# Patient Record
Sex: MALE | Race: WHITE | NOT HISPANIC OR LATINO | Employment: OTHER | URBAN - METROPOLITAN AREA
[De-identification: names, ages, dates, MRNs, and addresses within clinical notes are randomized per-mention and may not be internally consistent; named-entity substitution may affect disease eponyms.]

---

## 2018-08-20 ENCOUNTER — OFFICE VISIT (OUTPATIENT)
Dept: FAMILY MEDICINE CLINIC | Facility: CLINIC | Age: 38
End: 2018-08-20

## 2018-08-20 VITALS
DIASTOLIC BLOOD PRESSURE: 62 MMHG | OXYGEN SATURATION: 99 % | HEART RATE: 60 BPM | RESPIRATION RATE: 20 BRPM | TEMPERATURE: 97.8 F | SYSTOLIC BLOOD PRESSURE: 110 MMHG | WEIGHT: 221 LBS

## 2018-08-20 DIAGNOSIS — G89.29 HEEL PAIN, CHRONIC, RIGHT: ICD-10-CM

## 2018-08-20 DIAGNOSIS — M79.671 HEEL PAIN, CHRONIC, RIGHT: ICD-10-CM

## 2018-08-20 DIAGNOSIS — Z13.220 LIPID SCREENING: ICD-10-CM

## 2018-08-20 DIAGNOSIS — R51.9 ACUTE NONINTRACTABLE HEADACHE, UNSPECIFIED HEADACHE TYPE: Primary | ICD-10-CM

## 2018-08-20 DIAGNOSIS — R53.83 FATIGUE, UNSPECIFIED TYPE: ICD-10-CM

## 2018-08-20 LAB
ALBUMIN SERPL-MCNC: 4.5 G/DL (ref 3.2–4.8)
ALBUMIN/GLOB SERPL: 1.5 G/DL (ref 1.5–2.5)
ALP SERPL-CCNC: 66 U/L (ref 25–100)
ALT SERPL W P-5'-P-CCNC: 25 U/L (ref 7–40)
ANION GAP SERPL CALCULATED.3IONS-SCNC: 6 MMOL/L (ref 3–11)
AST SERPL-CCNC: 24 U/L (ref 0–33)
BASOPHILS # BLD AUTO: 0.01 10*3/MM3 (ref 0–0.2)
BASOPHILS NFR BLD AUTO: 0.2 % (ref 0–1)
BILIRUB SERPL-MCNC: 0.6 MG/DL (ref 0.3–1.2)
BUN BLD-MCNC: 13 MG/DL (ref 9–23)
BUN/CREAT SERPL: 13.5 (ref 7–25)
CALCIUM SPEC-SCNC: 9.6 MG/DL (ref 8.7–10.4)
CHLORIDE SERPL-SCNC: 105 MMOL/L (ref 99–109)
CHOLEST SERPL-MCNC: 199 MG/DL (ref 0–200)
CO2 SERPL-SCNC: 29 MMOL/L (ref 20–31)
CREAT BLD-MCNC: 0.96 MG/DL (ref 0.6–1.3)
DEPRECATED RDW RBC AUTO: 41.3 FL (ref 37–54)
EOSINOPHIL # BLD AUTO: 0.14 10*3/MM3 (ref 0–0.3)
EOSINOPHIL NFR BLD AUTO: 2.5 % (ref 0–3)
ERYTHROCYTE [DISTWIDTH] IN BLOOD BY AUTOMATED COUNT: 12.9 % (ref 11.3–14.5)
GFR SERPL CREATININE-BSD FRML MDRD: 89 ML/MIN/1.73
GLOBULIN UR ELPH-MCNC: 3 GM/DL
GLUCOSE BLD-MCNC: 94 MG/DL (ref 70–100)
HCT VFR BLD AUTO: 45.8 % (ref 38.9–50.9)
HDLC SERPL QL: 4.23
HDLC SERPL-MCNC: 47 MG/DL (ref 40–60)
HGB BLD-MCNC: 15.3 G/DL (ref 13.1–17.5)
IMM GRANULOCYTES # BLD: 0.02 10*3/MM3 (ref 0–0.03)
IMM GRANULOCYTES NFR BLD: 0.4 % (ref 0–0.6)
LDLC SERPL CALC-MCNC: 109 MG/DL (ref 0–100)
LYMPHOCYTES # BLD AUTO: 2.29 10*3/MM3 (ref 0.6–4.8)
LYMPHOCYTES NFR BLD AUTO: 40.2 % (ref 24–44)
MCH RBC QN AUTO: 29.5 PG (ref 27–31)
MCHC RBC AUTO-ENTMCNC: 33.4 G/DL (ref 32–36)
MCV RBC AUTO: 88.2 FL (ref 80–99)
MONOCYTES # BLD AUTO: 0.35 10*3/MM3 (ref 0–1)
MONOCYTES NFR BLD AUTO: 6.2 % (ref 0–12)
NEUTROPHILS # BLD AUTO: 2.9 10*3/MM3 (ref 1.5–8.3)
NEUTROPHILS NFR BLD AUTO: 50.9 % (ref 41–71)
PLATELET # BLD AUTO: 194 10*3/MM3 (ref 150–450)
PMV BLD AUTO: 12.4 FL (ref 6–12)
POTASSIUM BLD-SCNC: 4.9 MMOL/L (ref 3.5–5.5)
PROT SERPL-MCNC: 7.5 G/DL (ref 5.7–8.2)
RBC # BLD AUTO: 5.19 10*6/MM3 (ref 4.2–5.76)
SODIUM BLD-SCNC: 140 MMOL/L (ref 132–146)
TRIGL SERPL-MCNC: 216 MG/DL (ref 0–150)
VLDLC SERPL-MCNC: 43.2 MG/DL
WBC NRBC COR # BLD: 5.69 10*3/MM3 (ref 3.5–10.8)

## 2018-08-20 PROCEDURE — 99203 OFFICE O/P NEW LOW 30 MIN: CPT | Performed by: NURSE PRACTITIONER

## 2018-08-20 PROCEDURE — 36415 COLL VENOUS BLD VENIPUNCTURE: CPT | Performed by: NURSE PRACTITIONER

## 2018-08-20 PROCEDURE — 80053 COMPREHEN METABOLIC PANEL: CPT | Performed by: NURSE PRACTITIONER

## 2018-08-20 PROCEDURE — 80061 LIPID PANEL: CPT | Performed by: NURSE PRACTITIONER

## 2018-08-20 PROCEDURE — 85025 COMPLETE CBC W/AUTO DIFF WBC: CPT | Performed by: NURSE PRACTITIONER

## 2018-08-20 RX ORDER — CETIRIZINE HYDROCHLORIDE 10 MG/1
10 TABLET ORAL DAILY
COMMUNITY

## 2018-08-20 NOTE — PROGRESS NOTES
Erwin Montemayor is a 38 y.o. male who presents today to establish care. He has had right heel pain for several months intermittently. He plays soccer and this aggravates the pain. Rest does relieve it. He had xrays performed in [facility] and will have those sent here. He would like to be referred to podiatrist or orthopedist for this problem.   Patient also has had a problem with headache for approximately 10 days. It started in [place] while on vacation. It was very hot there and there was no air conditioning. He stated he had 3-4 alcoholic drinks at dinner one evening and the headache started after that. He states that it is located in the back of his head and is a throbbing pain. The pain will last 15-20 minutes and then slowly receed but he states that he feels the effects of the headache sometimes into the next day. He does not have prior history of headaches.    Chief Complaint   Patient presents with   • Headache   • Foot Pain     right side       HPI     The following portions of the patient's history were reviewed and updated as appropriate: allergies, current medications, past family history, past medical history, past social history, past surgical history and problem list.     Past Medical History:   Diagnosis Date   • Allergic        Past Surgical History:   Procedure Laterality Date   • NO PAST SURGERIES         Family History   Problem Relation Age of Onset   • No Known Problems Mother        Social History     Social History   • Marital status:      Spouse name: N/A   • Number of children: N/A   • Years of education: N/A     Occupational History   • Not on file.     Social History Main Topics   • Smoking status: Never Smoker   • Smokeless tobacco: Never Used   • Alcohol use 0.6 oz/week     1 Standard drinks or equivalent per week      Comment: one a day   • Drug use: Unknown   • Sexual activity: Defer     Other Topics Concern   • Not on file     Social History Narrative   • No narrative on file        Allergies   Allergen Reactions   • Penicillins Anaphylaxis   • Sulfa Antibiotics Anaphylaxis         Current Outpatient Prescriptions:   •  cetirizine (zyrTEC) 10 MG tablet, Take 10 mg by mouth Daily., Disp: , Rfl:     ROS    Review of Systems   Constitutional: Positive for fatigue. Negative for appetite change, chills, diaphoresis, fever, unexpected weight gain and unexpected weight loss.   HENT: Positive for postnasal drip and rhinorrhea. Negative for congestion, ear pain, facial swelling, sinus pressure, sneezing and sore throat.    Eyes: Negative for blurred vision and double vision.   Respiratory: Negative for apnea, cough, chest tightness, shortness of breath and wheezing.    Cardiovascular: Negative for chest pain, palpitations and leg swelling.   Gastrointestinal: Negative for constipation, diarrhea, nausea, vomiting, GERD and indigestion.   Musculoskeletal: Negative for arthralgias, back pain, joint swelling, myalgias, neck pain and neck stiffness.   Skin: Negative for color change, dry skin, pallor, rash, skin lesions and bruise.   Allergic/Immunologic: Positive for environmental allergies.   Neurological: Positive for headache. Negative for dizziness, tremors, seizures, syncope, facial asymmetry, speech difficulty, weakness, light-headedness, numbness, memory problem and confusion.   All other systems reviewed and are negative.      Visit Vitals  /62 (BP Location: Right arm, Patient Position: Sitting, Cuff Size: Adult)   Pulse 60   Temp 97.8 °F (36.6 °C) (Temporal Artery )   Resp 20   Wt 100 kg (221 lb)   SpO2 99%       Physical Exam     Physical Exam   Constitutional: He is oriented to person, place, and time. He appears well-developed and well-nourished.   HENT:   Head: Normocephalic and atraumatic.   Right Ear: External ear normal.   Left Ear: External ear normal.   Nose: Nose normal.   Mouth/Throat: Oropharynx is clear and moist. No oropharyngeal exudate.   Eyes: Pupils are equal, round,  and reactive to light. Conjunctivae and EOM are normal. Left eye exhibits no discharge. No scleral icterus.   Neck: Normal range of motion. Neck supple. No JVD present. No tracheal deviation present. No thyromegaly present.   Cardiovascular: Normal rate, regular rhythm, normal heart sounds and intact distal pulses.  Exam reveals no gallop and no friction rub.    No murmur heard.  Pulmonary/Chest: Effort normal and breath sounds normal. No respiratory distress. He has no wheezes. He has no rales. He exhibits no tenderness.   Abdominal: Soft. Bowel sounds are normal. He exhibits no distension and no mass. There is no tenderness. There is no rebound and no guarding. No hernia.   Musculoskeletal: Normal range of motion. He exhibits tenderness. He exhibits no edema or deformity.        Right foot: There is tenderness and bony tenderness. There is normal range of motion, no swelling, normal capillary refill, no crepitus, no deformity and no laceration.       Neurological Sensory Findings - Unaltered hot/cold right ankle/foot discrimination. Unaltered sharp/dull right ankle/foot discrimination. No right ankle/foot altered proprioception  Vascular Status -  His right foot exhibits normal foot vasculature  and no edema.  Skin Integrity  -  His right foot skin is not intact..   Foot Structure and Biomechanics -  His right foot has no hallux valgus, no pes cavus, no claw toes, no hammer toes and no cavovarus present.     Lymphadenopathy:     He has no cervical adenopathy.   Neurological: He is alert and oriented to person, place, and time. He has normal strength. He is not disoriented.   Skin: Skin is warm, dry and intact. Capillary refill takes less than 2 seconds. Turgor is normal.   Psychiatric: He has a normal mood and affect. His speech is normal and behavior is normal. Judgment and thought content normal. Cognition and memory are normal.       Assessment/Plan    Problem List Items Addressed This Visit     Acute  nonintractable headache - Primary    Relevant Orders    Comprehensive Metabolic Panel    Heel pain, chronic, right      Other Visit Diagnoses     Fatigue, unspecified type        Relevant Orders    CBC Auto Differential    Lipid screening        Relevant Orders    Lipid Panel With / Chol / HDL Ratio        Instructed patient to keep a headache journal for the next few weeks regarding time of onset, intensity, aggravating or relieving factors and length of headache.       Jessica Rodríguez, APRN

## 2018-08-29 ENCOUNTER — OFFICE VISIT (OUTPATIENT)
Dept: FAMILY MEDICINE CLINIC | Facility: CLINIC | Age: 38
End: 2018-08-29

## 2018-08-29 VITALS
WEIGHT: 221 LBS | SYSTOLIC BLOOD PRESSURE: 126 MMHG | DIASTOLIC BLOOD PRESSURE: 94 MMHG | RESPIRATION RATE: 20 BRPM | HEART RATE: 56 BPM

## 2018-08-29 DIAGNOSIS — R51.9 PERSISTENT HEADACHES: Primary | ICD-10-CM

## 2018-08-29 DIAGNOSIS — M77.31 CALCANEAL SPUR OF RIGHT FOOT: ICD-10-CM

## 2018-08-29 PROCEDURE — 99214 OFFICE O/P EST MOD 30 MIN: CPT | Performed by: NURSE PRACTITIONER

## 2018-08-29 NOTE — PATIENT INSTRUCTIONS
General Headache Without Cause  A headache is pain or discomfort felt around the head or neck area. The specific cause of a headache may not be found. There are many causes and types of headaches. A few common ones are:  · Tension headaches.  · Migraine headaches.  · Cluster headaches.  · Chronic daily headaches.    Follow these instructions at home:  Watch your condition for any changes. Take these steps to help with your condition:  Managing pain  · Take over-the-counter and prescription medicines only as told by your health care provider.  · Lie down in a dark, quiet room when you have a headache.  · If directed, apply ice to the head and neck area:  ? Put ice in a plastic bag.  ? Place a towel between your skin and the bag.  ? Leave the ice on for 20 minutes, 2-3 times per day.  · Use a heating pad or hot shower to apply heat to the head and neck area as told by your health care provider.  · Keep lights dim if bright lights bother you or make your headaches worse.  Eating and drinking  · Eat meals on a regular schedule.  · Limit alcohol use.  · Decrease the amount of caffeine you drink, or stop drinking caffeine.  General instructions  · Keep all follow-up visits as told by your health care provider. This is important.  · Keep a headache journal to help find out what may trigger your headaches. For example, write down:  ? What you eat and drink.  ? How much sleep you get.  ? Any change to your diet or medicines.  · Try massage or other relaxation techniques.  · Limit stress.  · Sit up straight, and do not tense your muscles.  · Do not use tobacco products, including cigarettes, chewing tobacco, or e-cigarettes. If you need help quitting, ask your health care provider.  · Exercise regularly as told by your health care provider.  · Sleep on a regular schedule. Get 7-9 hours of sleep, or the amount recommended by your health care provider.  Contact a health care provider if:  · Your symptoms are not helped by  medicine.  · You have a headache that is different from the usual headache.  · You have nausea or you vomit.  · You have a fever.  Get help right away if:  · Your headache becomes severe.  · You have repeated vomiting.  · You have a stiff neck.  · You have a loss of vision.  · You have problems with speech.  · You have pain in the eye or ear.  · You have muscular weakness or loss of muscle control.  · You lose your balance or have trouble walking.  · You feel faint or pass out.  · You have confusion.  This information is not intended to replace advice given to you by your health care provider. Make sure you discuss any questions you have with your health care provider.  Document Released: 12/18/2006 Document Revised: 05/25/2017 Document Reviewed: 04/11/2016  [employer] Interactive Patient Education © 2018 [employer]    Heel Spur  A heel spur is a bony growth that forms on the bottom of your heel bone (calcaneus). Heel spurs are common and do not always cause pain. However, heel spurs often cause inflammation in the strong band of tissue that runs underneath the bone of your foot (plantar fascia). When this happens, you may feel pain on the bottom of your foot, near your heel.  What are the causes?  The cause of heel spurs is not completely understood. They may be caused by pressure on the heel. Or, they may stem from the muscle attachments (tendons) near the spur pulling on the heel.  What increases the risk?  You may be at risk for a heel spur if you:  · Are older than 40.  · Are overweight.  · Have wear and tear arthritis (osteoarthritis).  · Have plantar fascia inflammation.    What are the signs or symptoms?  Some people have heel spurs but no symptoms. If you do have symptoms, they may include:  · Pain in the bottom of your heel.  · Pain that is worse when you first get out of bed.  · Pain that gets worse after walking or standing.    How is this diagnosed?  Your health care provider may diagnose a heel spur  based on your symptoms and a physical exam. You may also have an X-ray of your foot to check for a bony growth coming from the calcaneus.  How is this treated?  Treatment aims to relieve the pain from the heel spur. This may include:  · Stretching exercises.  · Losing weight.  · Wearing specific shoes, inserts, or orthotics for comfort and support.  · Wearing splints at night to properly position your feet.  · Taking over-the-counter medicine to relieve pain.  · Being treated with high-intensity sound waves to break up the heel spur (extracorporeal shock wave therapy).  · Getting steroid injections in your heel to reduce swelling and ease pain.  · Having surgery if your heel spur causes long-term (chronic) pain.    Follow these instructions at home:  · Take medicines only as directed by your health care provider.  · Ask your health care provider if you should use ice or cold packs on the painful areas of your heel or foot.  · Avoid activities that cause you pain until you recover or as directed by your health care provider.  · Stretch before exercising or being physically active.  · Wear supportive shoes that fit well as directed by your health care provider. You might need to buy new shoes. Wearing old shoes or shoes that do not fit correctly may not provide the support that you need.  · Lose weight if your health care provider thinks you should. This can relieve pressure on your foot that may be causing pain and discomfort.  Contact a health care provider if:  · Your pain continues or gets worse.  This information is not intended to replace advice given to you by your health care provider. Make sure you discuss any questions you have with your health care provider.  Document Released: 01/24/2007 Document Revised: 05/25/2017 Document Reviewed: 02/18/2015  [employer] Interactive Patient Education © 2018 [employer]

## 2018-08-30 NOTE — PROGRESS NOTES
Subjective   Erwin Montemayor is a 38 y.o. male.   Mr. Montemayor presents for re-evaluation/management headaches and right foot pain.    Headache    This is a new problem. The current episode started 1 to 4 weeks ago. The problem occurs daily. The problem has been waxing and waning. The pain is located in the parietal and occipital region. The pain does not radiate. The pain quality is not similar to prior headaches. The quality of the pain is described as aching, throbbing and pulsating. The pain is severe. Pertinent negatives include no abdominal pain, abnormal behavior, back pain, blurred vision, dizziness, ear pain, eye pain, fever, hearing loss, loss of balance, muscle aches, nausea, neck pain, numbness, phonophobia, photophobia, rhinorrhea, scalp tenderness, seizures, sinus pressure, sore throat, swollen glands, tingling, tinnitus, visual change, vomiting, weakness or weight loss. The symptoms are aggravated by intercourse, Valsalva and activity. He has tried NSAIDs for the symptoms. The treatment provided no relief. There is no history of hypertension, migraine headaches, recent head traumas or sinus disease.   Lower Extremity Issue   This is a new problem. The current episode started more than 1 month ago. The problem occurs intermittently. The problem has been waxing and waning. Associated symptoms include arthralgias (right foot pain, worse with weight-bearing) and headaches. Pertinent negatives include no abdominal pain, chills, congestion, diaphoresis, fatigue, fever, nausea, neck pain, numbness, rash, sore throat, swollen glands, visual change, vomiting or weakness. The symptoms are aggravated by standing. He has tried NSAIDs and rest for the symptoms. The treatment provided no relief.     Received patient right foot xray report from [facility]. Report reviewed by me and discussed findings with patient-small spur Tomas's tendon insertion site right calcaneus.    The following portions of  the patient's history were reviewed and updated as appropriate: allergies, current medications, past family history, past medical history, past social history, past surgical history and problem list.     Allergies   Allergen Reactions   • Penicillins Anaphylaxis   • Sulfa Antibiotics Anaphylaxis     Review of Systems   Constitutional: Negative for appetite change, chills, diaphoresis, fatigue, fever, unexpected weight change and weight loss.   HENT: Negative for congestion, dental problem, ear discharge, ear pain, facial swelling, hearing loss, postnasal drip, rhinorrhea, sinus pain, sinus pressure, sore throat, tinnitus, trouble swallowing and voice change.    Eyes: Negative for blurred vision, photophobia and pain.   Respiratory: Negative.    Cardiovascular: Negative.    Gastrointestinal: Negative.  Negative for abdominal pain, nausea and vomiting.   Musculoskeletal: Positive for arthralgias (right foot pain, worse with weight-bearing). Negative for back pain and neck pain.   Skin: Negative for rash.   Neurological: Positive for headaches. Negative for dizziness, tingling, tremors, seizures, syncope, facial asymmetry, speech difficulty, weakness, light-headedness, numbness and loss of balance.   Hematological: Negative.    Psychiatric/Behavioral: Negative.        Objective   Physical Exam   Constitutional: He is oriented to person, place, and time. Vital signs are normal. He appears well-developed and well-nourished. He is cooperative. He does not appear ill. No distress.   HENT:   Mouth/Throat: Uvula is midline and mucous membranes are normal.   Eyes: Pupils are equal, round, and reactive to light.   Cardiovascular: Normal rate, regular rhythm and normal heart sounds.    Pulmonary/Chest: Effort normal and breath sounds normal.   Musculoskeletal:        Right foot: There is tenderness (right heel). There is no swelling and no deformity.        Left foot: Normal.   Neurological: He is alert and oriented to person,  place, and time.   Skin: Skin is warm, dry and intact. No rash noted. He is not diaphoretic.   Psychiatric: He has a normal mood and affect. His behavior is normal. Judgment and thought content normal.   Vitals reviewed.    Vitals:    08/29/18 1629   BP: 126/94   Pulse: 56   Resp: 20     Assessment/Plan   Erwin was seen today for headache.    Diagnoses and all orders for this visit:    Persistent headaches  -     CT Head Without Contrast; Future  -     Ambulatory Referral to Neurology    Calcaneal spur of right foot  -     Ambulatory Referral to Podiatry     Discussed the nature of the medical condition(s) risks, complications, implications, management, safe and proper use of medications. Encouraged medication compliance, and keeping scheduled follow up appointments with me and any other providers.

## 2018-09-04 ENCOUNTER — OFFICE VISIT (OUTPATIENT)
Dept: NEUROLOGY | Facility: CLINIC | Age: 38
End: 2018-09-04

## 2018-09-04 VITALS
HEART RATE: 89 BPM | HEIGHT: 70 IN | SYSTOLIC BLOOD PRESSURE: 124 MMHG | DIASTOLIC BLOOD PRESSURE: 88 MMHG | BODY MASS INDEX: 31.64 KG/M2 | OXYGEN SATURATION: 98 % | WEIGHT: 221 LBS

## 2018-09-04 DIAGNOSIS — G44.84 PRIMARY EXERTIONAL HEADACHE: Primary | ICD-10-CM

## 2018-09-04 PROBLEM — R51.9 ACUTE NONINTRACTABLE HEADACHE: Status: RESOLVED | Noted: 2018-08-20 | Resolved: 2018-09-04

## 2018-09-04 PROCEDURE — 99203 OFFICE O/P NEW LOW 30 MIN: CPT | Performed by: PSYCHIATRY & NEUROLOGY

## 2018-09-04 RX ORDER — INDOMETHACIN 50 MG/1
50 CAPSULE ORAL 2 TIMES DAILY WITH MEALS
Qty: 60 CAPSULE | Refills: 3 | Status: SHIPPED | OUTPATIENT
Start: 2018-09-04 | End: 2019-02-11

## 2018-09-04 NOTE — PROGRESS NOTES
Subjective:    CC: Erwin Montemayor is seen today in consultation at the request of DEE Martinez for Migraine       HPI:  38-year-old male with no significant past medical history presents with headaches.  As per patient he started having headaches about 3 weeks ago.  His first headache was on waking up in the morning after he had a few beers the previous night.  It worsened after he went to the gym to exercise.  Since then the headache always occurs with exertion such as lifting weights or sexual intercourse.  It is mainly at the top of his head on both sides without any nausea, vomiting, photophobia, phonophobia or neck stiffness.  Since the past week he has been avoiding all exertional activity which has helped control the headaches.  He typically takes ibuprofen for it.  CT head is pending.    The following portions of the patient's history were reviewed today and updated as of 09/04/2018  : allergies, current medications, past family history, past medical history, past social history, past surgical history and problem list  These document will be scanned to patient's chart.      Current Outpatient Prescriptions:   •  cetirizine (zyrTEC) 10 MG tablet, Take 10 mg by mouth Daily., Disp: , Rfl:   •  Minoxidil (ROGAINE MENS EX), Apply  topically., Disp: , Rfl:   •  Multiple Vitamin (ONE-A-DAY ESSENTIAL PO), Take  by mouth., Disp: , Rfl:   •  indomethacin (INDOCIN) 50 MG capsule, Take 1 capsule by mouth 2 (Two) Times a Day With Meals., Disp: 60 capsule, Rfl: 3   Past Medical History:   Diagnosis Date   • Allergic       Past Surgical History:   Procedure Laterality Date   • NO PAST SURGERIES        Family History   Problem Relation Age of Onset   • No Known Problems Mother       Social History     Social History   • Marital status:      Spouse name: N/A   • Number of children: N/A   • Years of education: N/A     Occupational History   • Not on file.     Social History Main Topics   • Smoking status:  "Never Smoker   • Smokeless tobacco: Never Used   • Alcohol use 4.8 - 9.0 oz/week     1 Standard drinks or equivalent, 7 - 14 Cans of beer per week      Comment: one a day   • Drug use: No   • Sexual activity: Defer     Other Topics Concern   • Not on file     Social History Narrative   • No narrative on file     Review of Systems   HENT: Negative.    Eyes: Negative.    Respiratory: Negative.    Gastrointestinal: Negative.    Endocrine: Negative.    Neurological: Positive for headache.   Hematological: Negative.    Psychiatric/Behavioral: Negative.        Objective:    /88 (BP Location: Right arm, Patient Position: Sitting, Cuff Size: Adult)   Pulse 89   Ht 177.8 cm (70\")   Wt 100 kg (221 lb)   SpO2 98%   BMI 31.71 kg/m²     Neurology Exam:    General apperance: NAD.  No neck stiffness    Mental status: Alert, awake and oriented to time place and person.    Recent and Remote memory: Intact.    Attention span and Concentration: Normal.     Language and Speech: Intact- No dysarthria.    Fluency, Naming , Repitition and Comprehension:  Intact    Cranial Nerves:   CN II: Visual fields are full. Intact. Fundi - Normal, No papillederma, Pupils - ALFRED  CN III, IV and VI: Extraocular movements are intact. Normal saccades.   CN V: Facial sensation is intact.   CN VII: Muscles of facial expression reveal no asymmetry. Intact.   CN VIII: Hearing is intact. Whispered voice intact.   CN IX and X: Palate elevates symmetrically. Intact  CN XI: Shoulder shrug is intact.   CN XII: Tongue is midline without evidence of atrophy or fasciculation.     Motor:  Right UE muscle strength 5/5. Normal tone.     Left UE muscle strength 5/5. Normal tone.      Right LE muscle strength5/5. Normal tone.     Left LE muscle strength 5/5. Normal tone.      Sensory: Normal light touch, vibration and pinprick sensation bilaterally.    DTRs: 2+ bilaterally in upper and lower extremities.    Babinski: Negative bilaterally.    Co-ordination: " Normal finger-to-nose, heel to shin B/L.    Rhomberg: Negative.    Gait: Normal.    Cardiovascular: Regular rate and rhythm without murmur, gallop or rub.    Assessment and Plan:  1. Primary exertional headache  As most of these headaches are indomethacin responsive I will start him on indomethacin 50 mg twice a day with meals for 1 week.  After that he can take  mg as needed before any exertional activity.  However if after one week he feels the headaches coming on again he can even take a longer course of indomethacin.  CT head is pending to rule out any secondary causes.       Return in about 4 weeks (around 10/2/2018).         Karey Rowe MD

## 2018-09-05 ENCOUNTER — APPOINTMENT (OUTPATIENT)
Dept: CT IMAGING | Facility: HOSPITAL | Age: 38
End: 2018-09-05

## 2018-09-07 ENCOUNTER — HOSPITAL ENCOUNTER (OUTPATIENT)
Dept: CT IMAGING | Facility: HOSPITAL | Age: 38
Discharge: HOME | End: 2018-09-07
Admitting: NURSE PRACTITIONER

## 2018-09-07 DIAGNOSIS — R51.9 PERSISTENT HEADACHES: ICD-10-CM

## 2018-09-07 PROCEDURE — 70450 CT HEAD/BRAIN W/O DYE: CPT

## 2018-09-21 ENCOUNTER — OFFICE VISIT (OUTPATIENT)
Dept: FAMILY MEDICINE CLINIC | Facility: CLINIC | Age: 38
End: 2018-09-21

## 2018-09-21 VITALS
OXYGEN SATURATION: 98 % | WEIGHT: 222 LBS | DIASTOLIC BLOOD PRESSURE: 66 MMHG | SYSTOLIC BLOOD PRESSURE: 110 MMHG | HEART RATE: 58 BPM | HEIGHT: 70 IN | TEMPERATURE: 98 F | BODY MASS INDEX: 31.78 KG/M2

## 2018-09-21 DIAGNOSIS — M79.671 HEEL PAIN, CHRONIC, RIGHT: Primary | ICD-10-CM

## 2018-09-21 DIAGNOSIS — G89.29 HEEL PAIN, CHRONIC, RIGHT: Primary | ICD-10-CM

## 2018-09-21 DIAGNOSIS — G44.84 PRIMARY EXERTIONAL HEADACHE: ICD-10-CM

## 2018-09-21 PROCEDURE — 99213 OFFICE O/P EST LOW 20 MIN: CPT | Performed by: NURSE PRACTITIONER

## 2018-09-21 NOTE — PATIENT INSTRUCTIONS
Plantar Fasciitis Rehab  Ask your health care provider which exercises are safe for you. Do exercises exactly as told by your health care provider and adjust them as directed. It is normal to feel mild stretching, pulling, tightness, or discomfort as you do these exercises, but you should stop right away if you feel sudden pain or your pain gets worse. Do not begin these exercises until told by your health care provider.  Stretching and range of motion exercises  These exercises warm up your muscles and joints and improve the movement and flexibility of your foot. These exercises also help to relieve pain.  Exercise A: Plantar fascia stretch    1. Sit with your left / right leg crossed over your opposite knee.  2. Hold your heel with one hand with that thumb near your arch. With your other hand, hold your toes and gently pull them back toward the top of your foot. You should feel a stretch on the bottom of your toes or your foot or both.  3. Hold this stretch for__________ seconds.  4. Slowly release your toes and return to the starting position.  Repeat __________ times. Complete this exercise __________ times a day.  Exercise B: Gastroc, standing    1. Stand with your hands against a wall.  2. Extend your left / right leg behind you, and bend your front knee slightly.  3. Keeping your heels on the floor and keeping your back knee straight, shift your weight toward the wall without arching your back. You should feel a gentle stretch in your left / right calf.  4. Hold this position for __________ seconds.  Repeat __________ times. Complete this exercise __________ times a day.  Exercise C: Soleus, standing  1. Stand with your hands against a wall.  2. Extend your left / right leg behind you, and bend your front knee slightly.  3. Keeping your heels on the floor, bend your back knee and slightly shift your weight over the back leg. You should feel a gentle stretch deep in your calf.  4. Hold this position for  __________ seconds.  Repeat __________ times. Complete this exercise __________ times a day.  Exercise D: Gastrocsoleus, standing  1. Stand with the ball of your left / right foot on a step. The ball of your foot is on the walking surface, right under your toes.  2. Keep your other foot firmly on the same step.  3. Hold onto the wall or a railing for balance.  4. Slowly lift your other foot, allowing your body weight to press your heel down over the edge of the step. You should feel a stretch in your left / right calf.  5. Hold this position for __________ seconds.  6. Return both feet to the step.  7. Repeat this exercise with a slight bend in your left / right knee.  Repeat __________ times with your left / right knee straight and __________ times with your left / right knee bent. Complete this exercise __________ times a day.  Balance exercise  This exercise builds your balance and strength control of your arch to help take pressure off your plantar fascia.  Exercise E: Single leg stand  1. Without shoes, stand near a railing or in a doorway. You may hold onto the railing or door frame as needed.  2. Stand on your left / right foot. Keep your big toe down on the floor and try to keep your arch lifted. Do not let your foot roll inward.  3. Hold this position for __________ seconds.  4. If this exercise is too easy, you can try it with your eyes closed or while standing on a pillow.  Repeat __________ times. Complete this exercise __________ times a day.  This information is not intended to replace advice given to you by your health care provider. Make sure you discuss any questions you have with your health care provider.  Document Released: 12/18/2006 Document Revised: 08/22/2017 Document Reviewed: 10/31/2016  Elsevier Interactive Patient Education © 2018 [employer]

## 2018-09-21 NOTE — PROGRESS NOTES
Subjective   Erwin Montemayor is a 38 y.o. male.   Mr. Montemayor presents today for f/u visit for persistent headaches and chronic right heel pain.  Since last office visit patient has had a CT of the head and has seen Dr. Karey Rowe (neurologist). He was started on Indomethacin for his headaches at neurology and states they have now resolved. In addition, Mr. Montemayor went to see [facility]  regarding his heel pain but states they did not offer any treatment other than surgery or orthotics and he would like a referral to sports medicine for a second opinion and possible cortisone injection.    History of Present Illness    The following portions of the patient's history were reviewed and updated as appropriate: allergies, current medications, past family history, past medical history, past social history, past surgical history and problem list.     Allergies   Allergen Reactions   • Penicillins Anaphylaxis   • Sulfa Antibiotics Anaphylaxis     Current Outpatient Prescriptions on File Prior to Visit   Medication Sig   • cetirizine (zyrTEC) 10 MG tablet Take 10 mg by mouth Daily.   • indomethacin (INDOCIN) 50 MG capsule Take 1 capsule by mouth 2 (Two) Times a Day With Meals.   • Minoxidil (ROGAINE MENS EX) Apply  topically.   • Multiple Vitamin (ONE-A-DAY ESSENTIAL PO) Take  by mouth.     No current facility-administered medications on file prior to visit.      EXAMINATION: CT HEAD WO CONTRAST- 09/07/2018     INDICATION: R51-Headache; chronic      TECHNIQUE: Multiple axial CT imaging was obtained of the head from skull  base to skull vertex without the administration of intravenous contrast.     The radiation dose reduction device was turned on for each scan per the  ALARA (As Low as Reasonably Achievable) protocol.     COMPARISON: NONE     FINDINGS: The parenchyma is grossly unremarkable in appearance. No  hemorrhage or hydrocephalus. No mass, mass effect, or midline shift. No  abnormal extra-axial  fluid collection is identified. Bony structures  reveal no evidence of osseous abnormality. The visualized paranasal  sinuses are clear. The mastoid air cells are patent.     IMPRESSION:  No acute intracranial abnormality.     D:  09/07/2018  E:  09/07/2018         Review of Systems   Constitutional: Negative.    HENT: Negative.    Respiratory: Negative.    Cardiovascular: Negative.    Gastrointestinal: Negative.    Musculoskeletal:        Chronic right heel pain   Neurological: Negative.  Headaches: resolved.       Objective   Physical Exam   Constitutional: He is oriented to person, place, and time. Vital signs are normal. He appears well-developed and well-nourished. He is cooperative. No distress.   HENT:   Mouth/Throat: Uvula is midline and mucous membranes are normal.   Cardiovascular: Normal rate.    Pulmonary/Chest: Effort normal.   Neurological: He is alert and oriented to person, place, and time.   Skin: Skin is warm, dry and intact. No rash noted. He is not diaphoretic.   Psychiatric: He has a normal mood and affect. His behavior is normal. Judgment and thought content normal.   Vitals reviewed.    Vitals:    09/21/18 1255   BP: 110/66   Pulse: 58   Temp: 98 °F (36.7 °C)   SpO2: 98%   Body mass index is 31.85 kg/m².     Assessment/Plan   Erwin was seen today for headache.    Diagnoses and all orders for this visit:    Heel pain, chronic, right  -     Ambulatory Referral to Sports Medicine    Primary exertional headache       Keep f/u appointment with neurology on 10/5/18.    Discussed the nature of the medical condition(s) risks, complications, implications, management, safe and proper use of medications. Encouraged medication compliance, and keeping scheduled follow up appointments with me and any other providers.

## 2018-10-01 ENCOUNTER — OFFICE VISIT (OUTPATIENT)
Dept: ORTHOPEDIC SURGERY | Facility: CLINIC | Age: 38
End: 2018-10-01

## 2018-10-01 VITALS — OXYGEN SATURATION: 99 % | BODY MASS INDEX: 29.2 KG/M2 | WEIGHT: 215.61 LBS | HEART RATE: 59 BPM | HEIGHT: 72 IN

## 2018-10-01 DIAGNOSIS — M65.28 CALCIFIC ACHILLES TENDINITIS OF RIGHT LOWER EXTREMITY: ICD-10-CM

## 2018-10-01 DIAGNOSIS — M79.671 RIGHT FOOT PAIN: Primary | ICD-10-CM

## 2018-10-01 PROCEDURE — 99203 OFFICE O/P NEW LOW 30 MIN: CPT | Performed by: ORTHOPAEDIC SURGERY

## 2018-10-01 NOTE — PROGRESS NOTES
[facility] Note    Subjective     Chief Complaint   Patient presents with   • Right Foot - Pain        HPI      Erwin Montemayor is a 38 y.o. male.  Planes of right foot pain is 3 years.  The real insert.  Pain is 0 out of 10 at most the time it is dull.  It is over the Achilles tendon insertion on the calcaneus.  He's had no prior treatment.        Past Medical History:   Diagnosis Date   • Allergic       Past Surgical History:   Procedure Laterality Date   • NO PAST SURGERIES        Family History   Problem Relation Age of Onset   • No Known Problems Mother      Social History     Social History   • Marital status:      Spouse name: N/A   • Number of children: N/A   • Years of education: N/A     Occupational History   • Not on file.     Social History Main Topics   • Smoking status: Never Smoker   • Smokeless tobacco: Never Used   • Alcohol use 4.8 - 9.0 oz/week     1 Standard drinks or equivalent, 7 - 14 Cans of beer per week      Comment: one a day   • Drug use: No   • Sexual activity: Defer     Other Topics Concern   • Not on file     Social History Narrative   • No narrative on file      Current Outpatient Prescriptions on File Prior to Visit   Medication Sig Dispense Refill   • cetirizine (zyrTEC) 10 MG tablet Take 10 mg by mouth Daily.     • indomethacin (INDOCIN) 50 MG capsule Take 1 capsule by mouth 2 (Two) Times a Day With Meals. 60 capsule 3   • Minoxidil (ROGAINE MENS EX) Apply  topically.     • Multiple Vitamin (ONE-A-DAY ESSENTIAL PO) Take  by mouth.       No current facility-administered medications on file prior to visit.       Allergies   Allergen Reactions   • Penicillins Anaphylaxis   • Sulfa Antibiotics Anaphylaxis        The following portions of the patient's history were reviewed and updated as appropriate: allergies, current medications, past family history, past medical history, past social history, past surgical history and problem list.    Review of Systems  "  Constitutional: Negative.    HENT: Negative.    Eyes: Negative.    Respiratory: Negative.    Cardiovascular: Negative.    Gastrointestinal: Negative.    Endocrine: Negative.    Genitourinary: Negative.    Musculoskeletal: Positive for arthralgias.   Skin: Negative.    Allergic/Immunologic: Negative.    Neurological: Negative.    Hematological: Negative.    Psychiatric/Behavioral: Negative.         Objective      Physical Exam  Pulse 59   Ht 184 cm (72.44\")   Wt 97.8 kg (215 lb 9.8 oz)   SpO2 99%   BMI 28.89 kg/m²     Body mass index is 28.89 kg/m².        GENERAL APPEARANCE: awake, alert & oriented x 3, in no acute distress and well developed, well nourished  PSYCH: normal mood and affect  LUNGS:  breathing nonlabored, no wheezing  EYES: sclera anicteric, pupils equal  CARDIOVASCULAR: palpable pulses dorsalis pedis, palpable posterior tibial bilaterally. Capillary refill less than 2 seconds  INTEGUMENTARY: skin intact, no clubbing, cyanosis  NEUROLOGIC:  Normal Sensation and reflexes             Ortho Exam  Peripheral Vascular:  Lower Extremity:  Inspection:  Right--rapid capillary refill  Palpation:  Dorsalis pedis pulse:   Right--normal    Neurologic  Sensory:    Light Touch:     Right foot:  Dorsal intact and plantar intact       Overall Assessment of Muscle Strength and Tone:  Lower Extremities:     Right:  Tibialis anterior--5/5    Gastroc soleus--5/5    EHL--5/5    FHL--5/5      Musculoskeletal  Lower Extremity  Ankle/Foot:  Inspection and Palpation:     Right:  Tenderness: Achilles tendon insertion on the calcaneus    Swelling:none    Effusion:  None    Crepitus:  None       ROM:   Right:  Plantarflexion--50    Dorsiflexion--20    Inversion--10    Eversion--10        Instability:     Right:  Anterior drawer test--negative    Squeeze test--negative    Talar tilt test--negative        Imaging/Studies  Imaging Results (last 7 days)     Procedure Component Value Units Date/Time    XR Foot 3+ View Right " [[id number]] Resulted:  10/01/18 1501     Updated:  10/01/18 1501    Narrative:       Right Foot X-Ray  Indication: Pain  AP, Lateral, and Oblique views    Findings: There is a small calcium deposit in his distal Achilles tendon at the insertion on the calcaneus  No fracture  No bony lesion  Normal soft tissues  Normal joint spaces    No prior studies were available for comparison.              Assessment/Plan        ICD-10-CM ICD-9-CM   1. Right foot pain M79.671 729.5   2. 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  Different treatment options would include a boot and/or cast but we will try every first  Medical Decision Making  Management Options : over-the-counter medicine and physical/occupational therapy  Data/Risk: radiology tests and independent visualization of imaging, lab tests, or EMG/NCV    Brad Bravo MD  10/01/18  3:07 PM         EMR Dragon/Transcription disclaimer:  Much of this encounter note is an electronic transcription of spoken language to printed text. Electronic transcription of spoken language may permit erroneous, or at times, nonsensical words or phrases to be inadvertently transcribed. Although I have reviewed the note for such errors, some may still exist.

## 2018-10-08 ENCOUNTER — HOSPITAL ENCOUNTER (OUTPATIENT)
Dept: PHYSICAL THERAPY | Facility: HOSPITAL | Age: 38
Setting detail: THERAPIES SERIES
Discharge: HOME | End: 2018-10-08

## 2018-10-08 DIAGNOSIS — G89.29 HEEL PAIN, CHRONIC, RIGHT: Primary | ICD-10-CM

## 2018-10-08 DIAGNOSIS — M79.671 HEEL PAIN, CHRONIC, RIGHT: Primary | ICD-10-CM

## 2018-10-08 PROCEDURE — 97161 PT EVAL LOW COMPLEX 20 MIN: CPT | Performed by: PHYSICAL THERAPIST

## 2018-10-08 NOTE — THERAPY EVALUATION
Outpatient Physical Therapy Ortho Initial Evaluation  [facility]     Patient Name: Erwin Montemayor  : 1980  MRN: [id number]  Today's Date: 10/8/2018      Visit Date: 10/08/2018    Patient Active Problem List   Diagnosis   • Heel pain, chronic, right   • Primary exertional headache        Past Medical History:   Diagnosis Date   • Allergic         Past Surgical History:   Procedure Laterality Date   • NO PAST SURGERIES         Visit Dx:     ICD-10-CM ICD-9-CM   1. Heel pain, chronic, right M79.671 729.5    G89.29 338.29             Patient History     Row Name 10/08/18 1300             History    Chief Complaint Pain  -RB      Type of Pain Foot pain  -RB      Brief Description of Current Complaint Pt reports R posterior heel pn that bothers him the day after running, jumping repetitively or participating in sports. The pn doesn't generally affect him w/ walking and general daily activities unless it has been aggravated from these activities beforehand. Pt notes tenderness/soreness in the back of his heel if he presses on it or puts pressure on it such as when watching TV w/ his feet propped on a hard surface. Denies pn in bottom of foot or that radiates up to the calf, stays localized. Has been using a lacrosse ball directly on his heel in attempt to relieve the pn, but does not seem to help. Denies a regular stretching program. Can take a week or 2 sometimes for pn to fully resolve.  -RB      Previous treatment for THIS PROBLEM Medication  -RB      Patient/Caregiver Goals Relieve pain  -RB      Current Tobacco Use None  -RB      Patient's Rating of General Health Excellent  -RB      Hand Dominance right-handed  -RB      Occupation/sports/leisure activities Pt is employed as a realtor. Enjoys soccer, mountain biking.  -RB      What clinical tests have you had for this problem? X-ray  -RB      Results of Clinical Tests unremarkable w/ exception of possible small calcium deposit at achilles insertion  -RB          Pain     Pain Location Foot   heel  -RB      Pain at Present 0  -RB      Pain at Best 0  -RB      Pain at Worst 4  -RB      Pain Frequency Intermittent  -RB      Pain Description Sore  -RB      What Performance Factors Make the Current Problem(s) WORSE? sports, running, jumping  -RB      What Performance Factors Make the Current Problem(s) BETTER? n/a  -RB      Is your sleep disturbed? No  -RB         Fall Risk Assessment    Any falls in the past year: No  -RB         Daily Activities    Primary Language English  -RB      How does patient learn best? Demonstration  -RB      Teaching needs identified Home Exercise Program;Management of Condition  -RB      Patient is concerned about/has problems with Performing sports, recreation, and play activities  -RB      Does patient have problems with the following? None  -RB      Barriers to learning None  -RB      Pt Participated in POC and Goals Yes  -RB         Safety    Are you being hurt, hit, or frightened by anyone at home or in your life? No  -RB      Are you being neglected by a caregiver No  -RB        User Key  (r) = Recorded By, (t) = Taken By, (c) = Cosigned By    Initials Name Provider Type    RB Krupa Mercado PT Physical Therapist                PT Ortho     Row Name 10/08/18 1300       Precautions and Contraindications    Precautions/Limitations no known precautions/limitations  -RB       Posture/Observations    Posture- WNL Posture is WNL  -RB    Posture/Observations Comments Pt presents to clinic ambulating independently w/o apparent deviation. Postural exam grossly unremarkable. No apparent edema noted. Upon unilateral heel raise however, pt noted to have inversion moment on R that is not present when tested on the L.  -RB       Special Tests/Palpation    Special Tests/Palpation Ankle/Foot  -RB       Foot/Ankle Palpation    Foot/Ankle Palpation? No Tenderness/Abnormality  -RB       Ankle Accessory Motions    Ankle Accessory Motions Tested? Yes   -RB    Distal tib/fib A-P glide WNL  -RB    Talocrural (talotibial) distraction WNL  -RB    Talocrural (talotibial) A-P glide WNL  -RB    Subtalar medial/lateral tilt Right:;Hypomobile   decreased w/ eversion  -RB       Ankle/Foot Special Tests    Yeh test (Achilles’ tendon rupture) Negative  -RB    Ankle Special Tests Comments Windlass (-)  -RB       General ROM    RT Lower Ext Rt Ankle Dorsiflexion;Rt Ankle Plantarflexion;Rt Ankle Inversion;Rt Ankle Eversion  -RB    LT Lower Ext Lt Ankle Dorsiflexion;Lt Ankle Plantarflexion;Lt Ankle Inversion;Lt Ankle Eversion  -RB       Right Lower Ext    Rt Ankle Dorsiflexion AROM 11   w/ knee flexed 7  -RB    Rt Ankle Plantarflexion AROM 55  -RB    Rt Ankle Inversion AROM 36  -RB    Rt Ankle Eversion AROM 15  -RB       Left Lower Ext    Lt Ankle Dorsiflexion AROM 11   w/ knee flexed 11  -RB    Lt Ankle Plantarflexion AROM 50  -RB    Lt Ankle Inversion AROM 38  -RB    Lt Ankle Eversion AROM 21  -RB       MMT (Manual Muscle Testing)    Rt Lower Ext Rt Knee Extension;Rt Knee Flexion;Rt Ankle Plantarflexion;Rt Ankle Dorsiflexion;Rt Ankle Subtalar Inversion;Rt Ankle Subtalar Eversion  -RB    Lt Lower Ext Lt Knee Extension;Lt Knee Flexion;Lt Ankle Plantarflexion;Lt Ankle Dorsiflexion;Lt Ankle Subtalar Inversion;Lt Ankle Subtalar Eversion  -RB       MMT Right Lower Ext    Rt Knee Extension MMT, Gross Movement (5/5) normal  -RB    Rt Knee Flexion MMT, Gross Movement (5/5) normal  -RB    Rt Ankle Plantarflexion MMT, Gross Movement (4+/5) good plus  -RB    Rt Ankle Dorsiflexion MMT, Gross Movement (5/5) normal  -RB    Rt Ankle Subtalar Inversion MT, Gross Movement (5/5) normal  -RB    Rt Ankle Subtalar Eversion MMT, Gross Movement (5/5) normal  -RB       MMT Left Lower Ext    Lt Knee Extension MMT, Gross Movement (5/5) normal  -RB    Lt Knee Flexion MMT, Gross Movement (5/5) normal  -RB    Lt Ankle Plantarflexion MMT, Gross Movement (5/5) normal  -RB    Lt Ankle Dorsiflexion MMT,  Gross Movement (5/5) normal  -RB    Lt Ankle Subtalar Inversion MMT, Gross Movement (5/5) normal  -RB    Lt Ankle Subtalar Eversion MMT, Gross Movement (5/5) normal  -RB       Sensation    Sensation WNL? WNL  -RB       Flexibility    Flexibility Tested? Lower Extremity  -RB       Lower Extremity Flexibility    Gastrocnemius WNL  -RB    Soleus Right:;Mildly limited  -RB       Balance Skills Training    SLS intact BLE and w/o pn or apparent compensation  -RB      User Key  (r) = Recorded By, (t) = Taken By, (c) = Cosigned By    Initials Name Provider Type    Krupa Bowen PT Physical Therapist                      Therapy Education  Education Details: issued gastroc/soleus stretches, heel raises w/ focus on WBing over 1st MTP to decrease inversion moment. Also educated on STM over mm belly/tendon vs insertion site.  Given: HEP  Program: New  How Provided: Verbal, Demonstration, Written  Provided to: Patient  Level of Understanding: Teach back education performed           PT OP Goals     Row Name 10/08/18 1534 10/08/18 1500       PT Short Term Goals    STG Date to Achieve  -- 11/19/18  -RB    STG 1  -- Pt to be independent w/ HEP and self mgmt  -RB    STG 1 Progress  -- New  -RB    STG 2  -- Pt to demo 10+ deg of DF w/ knee slightly flexed to indicate restored soleus length.  -RB    STG 2 Progress  -- New  -RB    STG 3  -- Pt to perform 20 unilateral heel raises on the R w/ good technique, through full ROM, and w/o pn.  -RB    STG 3 Progress  -- New  -RB    STG 4  -- Pt to report no pn during within 24 hours of short distance running, jumping, or sports.  -RB    STG 4 Progress  -- New  -RB       Time Calculation    PT Goal Re-Cert Due Date 01/06/19  -RB 01/06/19  -RB      User Key  (r) = Recorded By, (t) = Taken By, (c) = Cosigned By    Initials Name Provider Type    Krupa Bowen PT Physical Therapist                PT Assessment/Plan     Row Name 10/08/18 1530          PT Assessment    Functional  Limitations Limitations in functional capacity and performance;Performance in leisure activities;Performance in sport activities;Limitation in home management;Limitations in community activities  -RB     Impairments Impaired flexibility;Joint mobility;Pain;Range of motion;Muscle strength  -RB     Assessment Comments Pt presents w/ complaint of chronic R retrocalcaneal pn. Findings consistent w/ insertional achilles tendonitis vs. retrocalcaneal bursitis. He demonstrates impaired strength/neuromuscular control, soleus length, and accessory subtalar joint mobility limiting his tolerance to running/sports and secondarily to daily activities. He may benefit from skilled PT services to address deficits, decrease pn, and maximize function.  -RB     Please refer to paper survey for additional self-reported information Yes  -RB     Rehab Potential Good  -RB     Patient/caregiver participated in establishment of treatment plan and goals Yes  -RB     Patient would benefit from skilled therapy intervention Yes  -RB        PT Plan    PT Frequency 1x/week  -RB     Predicted Duration of Therapy Intervention (Therapy Eval) 6-8 visits  -RB     Planned CPT's? PT EVAL LOW COMPLEXITY: 20913;PT RE-EVAL: 77330;PT THER PROC EA 15 MIN: 99631;PT NEUROMUSC RE-EDUCATION EA 15 MIN: 89587;PT MANUAL THERAPY EA 15 MIN: 03687;PT GAIT TRAINING EA 15 MIN: 44671;PT ULTRASOUND EA 15 MIN: 59711;PT IONTOPHORESIS EA 15 MIN: 60253;PT HOT/COLD PACK WC NONMCARE: 45039;PT ELECTRICAL STIM UNATTEND:   -RB     PT Plan Comments PT POC to include progressive ankle strengthening, balance training, MT techniques to restore accessory joint mobility, and modalities as indicated  -RB       User Key  (r) = Recorded By, (t) = Taken By, (c) = Cosigned By    Initials Name Provider Type    RB Krupa Mercado, PT Physical Therapist                              Outcome Measure Options: Lower Extremity Functional Scale (LEFS)  Lower Extremity Functional Index  Any of  your usual work, housework or school activities: No difficulty  Your usual hobbies, recreational or sporting activities: Moderate difficulty  Getting into or out of the bath: No difficulty  Walking between rooms: No difficulty  Putting on your shoes or socks: No difficulty  Squatting: No difficulty  Lifting an object, like a bag of groceries from the floor: No difficulty  Performing light activities around your home: No difficulty  Performing heavy activities around your home: No difficulty  Getting into or out of a car: No difficulty  Walking 2 blocks: No difficulty  Walking a mile: No difficulty  Going up or down 10 stairs (about 1 flight of stairs): No difficulty  Standing for 1 hour: No difficulty  Sitting for 1 hour: No difficulty  Running on even ground: Quite a bit of difficulty  Running on uneven ground: Moderate difficulty  Making sharp turns while running fast: No difficulty  Hopping: Moderate difficulty  Rolling over in bed: No difficulty  Total: 71      Time Calculation:     Therapy Suggested Charges     Code   Minutes Charges    None             Start Time: 1345     Therapy Charges for Today     Code Description Service Date Service Provider Modifiers Qty    46616519734 HC PT EVAL LOW COMPLEXITY 3 10/8/2018 Krupa Mercado, PT GP 1          PT G-Codes  Outcome Measure Options: Lower Extremity Functional Scale (LEFS)  Total: 71         Krupa Mercado PT  10/8/2018

## 2018-10-10 ENCOUNTER — OFFICE VISIT (OUTPATIENT)
Dept: NEUROLOGY | Facility: CLINIC | Age: 38
End: 2018-10-10

## 2018-10-10 VITALS
RESPIRATION RATE: 16 BRPM | OXYGEN SATURATION: 99 % | HEIGHT: 72 IN | DIASTOLIC BLOOD PRESSURE: 70 MMHG | WEIGHT: 215 LBS | HEART RATE: 61 BPM | SYSTOLIC BLOOD PRESSURE: 114 MMHG | BODY MASS INDEX: 29.12 KG/M2

## 2018-10-10 DIAGNOSIS — G44.84 PRIMARY EXERTIONAL HEADACHE: Primary | ICD-10-CM

## 2018-10-10 PROCEDURE — 99212 OFFICE O/P EST SF 10 MIN: CPT | Performed by: PSYCHIATRY & NEUROLOGY

## 2018-10-10 NOTE — PROGRESS NOTES
Subjective:    CC: Erwin Montemayor is seen today  for Headache (1 month follow up, pt feeling better)       HPI:  Current visit-since his last visit he took indomethacin 50 mg daily  (instead of bid) for just a few days but his headaches have resolved completely and he has not had any more episodes .  In fact he underwent a very rigorous exercise schedule recently but even that did not provoke the headaches.  His CT scan of the head was normal and did not show any abnormalities.    Initial visit- 38-year-old male with no significant past medical history presents with headaches.  As per patient he started having headaches about 3 weeks ago.  His first headache was on waking up in the morning after he had a few beers the previous night.  It worsened after he went to the gym to exercise.  Since then the headache always occurs with exertion such as lifting weights or sexual intercourse.  It is mainly at the top of his head on both sides without any nausea, vomiting, photophobia, phonophobia or neck stiffness.  Since the past week he has been avoiding all exertional activity which has helped control the headaches.  He typically takes ibuprofen for it.  CT head is pending.    The following portions of the patient's history were reviewed today and updated as of 10/10/2018  : allergies, current medications, past family history, past medical history, past social history, past surgical history and problem list  These document will be scanned to patient's chart.      Current Outpatient Prescriptions:   •  cetirizine (zyrTEC) 10 MG tablet, Take 10 mg by mouth Daily., Disp: , Rfl:   •  indomethacin (INDOCIN) 50 MG capsule, Take 1 capsule by mouth 2 (Two) Times a Day With Meals., Disp: 60 capsule, Rfl: 3  •  Minoxidil (ROGAINE MENS EX), Apply  topically., Disp: , Rfl:   •  Multiple Vitamin (ONE-A-DAY ESSENTIAL PO), Take  by mouth., Disp: , Rfl:    Past Medical History:   Diagnosis Date   • Allergic       Past Surgical History:  "  Procedure Laterality Date   • NO PAST SURGERIES        Family History   Problem Relation Age of Onset   • No Known Problems Mother       Social History     Social History   • Marital status:      Spouse name: N/A   • Number of children: N/A   • Years of education: N/A     Occupational History   • Not on file.     Social History Main Topics   • Smoking status: Never Smoker   • Smokeless tobacco: Never Used   • Alcohol use 4.8 - 9.0 oz/week     1 Standard drinks or equivalent, 7 - 14 Cans of beer per week      Comment: one a day   • Drug use: No   • Sexual activity: Defer     Other Topics Concern   • Not on file     Social History Narrative   • No narrative on file     Review of Systems    Objective:    /70   Pulse 61   Resp 16   Ht 184 cm (72.44\")   Wt 97.5 kg (215 lb)   SpO2 99%   BMI 28.81 kg/m²     Neurology Exam:    General apperance: NAD.     Mental status: Alert, awake and oriented to time place and person.    Recent and Remote memory: Intact.    Attention span and Concentration: Normal.     Language and Speech: Intact- No dysarthria.    Fluency, Naming , Repitition and Comprehension:  Intact    Cranial Nerves:   CN II: Visual fields are full. Intact. Fundi - Normal, No papillederma, Pupils - ALFRED  CN III, IV and VI: Extraocular movements are intact. Normal saccades.   CN V: Facial sensation is intact.   CN VII: Muscles of facial expression reveal no asymmetry. Intact.   CN VIII: Hearing is intact. Whispered voice intact.   CN IX and X: Palate elevates symmetrically. Intact  CN XI: Shoulder shrug is intact.   CN XII: Tongue is midline without evidence of atrophy or fasciculation.     Ophthalmoscopic exam of optic disc-normal    Motor:  Right UE muscle strength 5/5. Normal tone.     Left UE muscle strength 5/5. Normal tone.      Right LE muscle strength5/5. Normal tone.     Left LE muscle strength 5/5. Normal tone.      Sensory: Normal light touch, vibration and pinprick sensation " bilaterally.    DTRs: 2+ bilaterally in upper and lower extremities.    Babinski: Negative bilaterally.    Co-ordination: Normal finger-to-nose, heel to shin B/L.    Rhomberg: Negative.    Gait: Normal.    Cardiovascular: Regular rate and rhythm without murmur, gallop or rub.    Assessment and Plan:  1. Primary exertional headache  His headaches have improved.  I have asked him to take indomethacin  only as needed if he does have an exertional headache in the future.       Return in about 3 months (around 1/10/2019).       Karey Rowe MD

## 2018-10-17 ENCOUNTER — APPOINTMENT (OUTPATIENT)
Dept: PHYSICAL THERAPY | Facility: HOSPITAL | Age: 38
End: 2018-10-17

## 2018-11-07 ENCOUNTER — DOCUMENTATION (OUTPATIENT)
Dept: PHYSICAL THERAPY | Facility: HOSPITAL | Age: 38
End: 2018-11-07

## 2018-11-07 DIAGNOSIS — M79.671 HEEL PAIN, CHRONIC, RIGHT: Primary | ICD-10-CM

## 2018-11-07 DIAGNOSIS — G89.29 HEEL PAIN, CHRONIC, RIGHT: Primary | ICD-10-CM

## 2018-11-07 NOTE — THERAPY DISCHARGE NOTE
Outpatient Physical Therapy Discharge Summary         Patient Name: Erwin Montemayor  : 1980  MRN: [id number]    Today's Date: 2018    Visit Dx:    ICD-10-CM ICD-9-CM   1. Heel pain, chronic, right M79.671 729.5    G89.29 338.29             PT OP Goals     Row Name 18 0800          PT Short Term Goals    STG Date to Achieve 18  -RB     STG 1 Pt to be independent w/ HEP and self mgmt  -RB     STG 1 Progress Not Met  -RB     STG 2 Pt to demo 10+ deg of DF w/ knee slightly flexed to indicate restored soleus length.  -RB     STG 2 Progress Not Met  -RB     STG 3 Pt to perform 20 unilateral heel raises on the R w/ good technique, through full ROM, and w/o pn.  -RB     STG 3 Progress Not Met  -RB     STG 4 Pt to report no pn during within 24 hours of short distance running, jumping, or sports.  -RB     STG 4 Progress Not Met  -RB       User Key  (r) = Recorded By, (t) = Taken By, (c) = Cosigned By    Initials Name Provider Type    Krupa Bowen PT Physical Therapist          OP PT Discharge Summary  Date of Discharge: 18  Reason for Discharge: Unable to pay/insurance denied care  Outcomes Achieved: Other  Discharge Destination: Home with home program  Discharge Instructions/Additional Comments: Pt presented for initial evaluation only, was issued HEP, but declined returning for follow ups due to high copay. Unable to assess goals.      Time Calculation:        Therapy Suggested Charges     Code   Minutes Charges    None                       Krupa Mercado, RAKAN  2018

## 2018-11-14 ENCOUNTER — OFFICE VISIT (OUTPATIENT)
Dept: ORTHOPEDIC SURGERY | Facility: CLINIC | Age: 38
End: 2018-11-14

## 2018-11-14 VITALS — OXYGEN SATURATION: 98 % | HEART RATE: 92 BPM | WEIGHT: 216.05 LBS | HEIGHT: 72 IN | BODY MASS INDEX: 29.26 KG/M2

## 2018-11-14 DIAGNOSIS — M65.28 CALCIFIC ACHILLES TENDINITIS OF RIGHT LOWER EXTREMITY: Primary | ICD-10-CM

## 2018-11-14 PROCEDURE — 99213 OFFICE O/P EST LOW 20 MIN: CPT | Performed by: ORTHOPAEDIC SURGERY

## 2018-11-14 NOTE — PROGRESS NOTES
[facility] Note    Subjective     Chief Complaint   Patient presents with   • Follow-up     6 weeks- Calcific Achilles tendinitis of right lower extremity        HPI      Erwin Montemayor is a 38 y.o. male.  He is follow-up right Achilles calcific tendinitis.  He went physical therapy has not gotten better.  He's had pain for 3 years.  It hurts most with explosive activity.  His pain today is actually 0.        Past Medical History:   Diagnosis Date   • Allergic       Past Surgical History:   Procedure Laterality Date   • NO PAST SURGERIES        Family History   Problem Relation Age of Onset   • No Known Problems Mother      Social History     Socioeconomic History   • Marital status:      Spouse name: Not on file   • Number of children: Not on file   • Years of education: Not on file   • Highest education level: Not on file   Social Needs   • Financial resource strain: Not on file   • Food insecurity - worry: Not on file   • Food insecurity - inability: Not on file   • Transportation needs - medical: Not on file   • Transportation needs - non-medical: Not on file   Occupational History   • Not on file   Tobacco Use   • Smoking status: Never Smoker   • Smokeless tobacco: Never Used   Substance and Sexual Activity   • Alcohol use: Yes     Alcohol/week: 4.8 - 9.0 oz     Types: 1 Standard drinks or equivalent, 7 - 14 Cans of beer per week     Comment: one a day   • Drug use: No   • Sexual activity: Defer   Other Topics Concern   • Not on file   Social History Narrative   • Not on file      Current Outpatient Medications on File Prior to Visit   Medication Sig Dispense Refill   • cetirizine (zyrTEC) 10 MG tablet Take 10 mg by mouth Daily.     • indomethacin (INDOCIN) 50 MG capsule Take 1 capsule by mouth 2 (Two) Times a Day With Meals. 60 capsule 3   • Minoxidil (ROGAINE MENS EX) Apply  topically.     • Multiple Vitamin (ONE-A-DAY ESSENTIAL PO) Take  by mouth.       No current  "facility-administered medications on file prior to visit.       Allergies   Allergen Reactions   • Penicillins Anaphylaxis   • Sulfa Antibiotics Anaphylaxis        The following portions of the patient's history were reviewed and updated as appropriate: allergies, current medications, past family history, past medical history, past social history, past surgical history and problem list.    Review of Systems   Constitutional: Negative.    HENT: Negative.    Eyes: Negative.    Respiratory: Negative.    Cardiovascular: Negative.    Gastrointestinal: Negative.    Endocrine: Negative.    Genitourinary: Negative.    Musculoskeletal: Positive for arthralgias.   Skin: Negative.    Allergic/Immunologic: Positive for environmental allergies.   Neurological: Negative.    Hematological: Negative.    Psychiatric/Behavioral: Negative.         Objective      Physical Exam  Pulse 92   Ht 184 cm (72.44\")   Wt 98 kg (216 lb 0.8 oz)   SpO2 98%   BMI 28.95 kg/m²     Body mass index is 28.95 kg/m².        GENERAL APPEARANCE: awake, alert & oriented x 3, in no acute distress and well developed, well nourished  PSYCH: normal mood and affect        Ortho Exam  Peripheral Vascular:  Lower Extremity:  Inspection:  Right--rapid capillary refill  Palpation:  Dorsalis pedis pulse:   Right--normal    Neurologic  Sensory:    Light Touch:     Right foot:  Dorsal intact and plantar intact       Overall Assessment of Muscle Strength and Tone:  Lower Extremities:     Right:  Tibialis anterior--5/5    Gastroc soleus--5/5    EHL--5/5    FHL--5/5      Musculoskeletal  Lower Extremity  Ankle/Foot:  Inspection and Palpation:     Right:  Tenderness: Achilles tendon insertion    Swelling:none    Effusion:  None    Crepitus:  None       ROM:   Right:  Plantarflexion--50    Dorsiflexion--20    Inversion--10    Eversion--10        Instability:     Right:  Anterior drawer test--negative    Squeeze test--negative    Talar tilt " test--negative        Imaging/Studies  Imaging Results (last 7 days)     ** No results found for the last 168 hours. **          Assessment/Plan        ICD-10-CM ICD-9-CM   1. Calcific Achilles tendinitis of right lower extremity M65.28 727.82       Orders Placed This Encounter   Procedures   • MRI Ankle Right Without Contrast    We will get the MRI to evaluate his chronic Achilles tendinopathy.  I'll see him back after the MRI.    Medical Decision Making  Management Options : over-the-counter medicine  Data/Risk: radiology tests    Brad Bravo MD  11/14/18  1:48 PM         EMR Dragon/Transcription disclaimer:  Much of this encounter note is an electronic transcription of spoken language to printed text. Electronic transcription of spoken language may permit erroneous, or at times, nonsensical words or phrases to be inadvertently transcribed. Although I have reviewed the note for such errors, some may still exist.

## 2019-02-11 ENCOUNTER — OFFICE VISIT (OUTPATIENT)
Dept: FAMILY MEDICINE CLINIC | Facility: CLINIC | Age: 39
End: 2019-02-11

## 2019-02-11 VITALS
HEART RATE: 86 BPM | WEIGHT: 224.13 LBS | BODY MASS INDEX: 30.36 KG/M2 | DIASTOLIC BLOOD PRESSURE: 90 MMHG | OXYGEN SATURATION: 96 % | HEIGHT: 72 IN | SYSTOLIC BLOOD PRESSURE: 130 MMHG | RESPIRATION RATE: 18 BRPM | TEMPERATURE: 99.1 F

## 2019-02-11 DIAGNOSIS — H10.32 ACUTE BACTERIAL CONJUNCTIVITIS OF LEFT EYE: Primary | ICD-10-CM

## 2019-02-11 PROCEDURE — 99213 OFFICE O/P EST LOW 20 MIN: CPT | Performed by: NURSE PRACTITIONER

## 2019-02-11 RX ORDER — OFLOXACIN 3 MG/ML
1 SOLUTION/ DROPS OPHTHALMIC 4 TIMES DAILY
Qty: 10 ML | Refills: 0 | Status: SHIPPED | OUTPATIENT
Start: 2019-02-11

## 2019-02-11 NOTE — PROGRESS NOTES
"Subjective   Erwin Montemayor is a 38 y.o. male.   Chief Complaint   Patient presents with   • Eye Problem     1 day      Eye Problem    The left eye is affected. The current episode started yesterday. The problem occurs constantly. The pain is at a severity of 2/10. The pain is mild. There is no known exposure to pink eye. He does not wear contacts. Associated symptoms include blurred vision. Pertinent negatives include no eye discharge, double vision, eye redness, fever, foreign body sensation, itching, nausea, photophobia, recent URI or vomiting. He has tried nothing for the symptoms. The treatment provided no relief.    has a URI in December.   Has a 10 month old son.       The following portions of the patient's history were reviewed and updated as appropriate: allergies, current medications, past family history, past medical history, past social history, past surgical history and problem list.    Review of Systems   Constitutional: Negative for fever.   HENT: Negative.    Eyes: Positive for blurred vision. Negative for double vision, photophobia, discharge, redness and itching.        Sclera is red   Irritated      Respiratory: Negative.    Cardiovascular: Negative.    Gastrointestinal: Negative.  Negative for nausea and vomiting.   Genitourinary: Negative.    Musculoskeletal: Negative.    Skin: Negative.    Allergic/Immunologic: Negative.    Neurological: Negative.    Hematological: Negative.    Psychiatric/Behavioral: Negative.      Past Surgical History:   Procedure Laterality Date   • NO PAST SURGERIES       Past Medical History:   Diagnosis Date   • Allergic        Objective   Allergies   Allergen Reactions   • Penicillins Anaphylaxis   • Sulfa Antibiotics Anaphylaxis     Visit Vitals  /90 (BP Location: Right arm, Patient Position: Sitting, Cuff Size: Adult)   Pulse 86   Temp 99.1 °F (37.3 °C) (Temporal)   Resp 18   Ht 182.9 cm (72\")   Wt 102 kg (224 lb 2 oz)   SpO2 96%   BMI 30.40 kg/m² "       Physical Exam   Constitutional: He is oriented to person, place, and time. He appears well-developed and well-nourished.   HENT:   Head: Normocephalic.   Eyes: Left eye exhibits discharge. Left conjunctiva is injected.   Sclera is erythematous   No current drainage   Slight visual disturbance when covering the right eye.      Pulmonary/Chest: Effort normal and breath sounds normal.   Abdominal: Soft. Bowel sounds are normal.   Lymphadenopathy:     He has no cervical adenopathy.   Neurological: He is alert and oriented to person, place, and time.   Skin: Skin is warm and dry. Capillary refill takes less than 2 seconds.   Psychiatric: He has a normal mood and affect. His behavior is normal.   Vitals reviewed.      Assessment/Plan   Erwin was seen today for eye problem.    Diagnoses and all orders for this visit:    Acute bacterial conjunctivitis of left eye  -     ofloxacin (OCUFLOX) 0.3 % ophthalmic solution; Administer 1 drop into the left eye 4 (Four) Times a Day.      See conjunctivitis patient instructions.              Patient Instructions   Bacterial Conjunctivitis  Bacterial conjunctivitis is an infection of the clear membrane that covers the white part of your eye and the inner surface of your eyelid (conjunctiva). When the blood vessels in your conjunctiva become inflamed, your eye becomes red or pink, and it will probably feel itchy. Bacterial conjunctivitis spreads very easily from person to person (is contagious). It also spreads easily from one eye to the other eye.  What are the causes?  This condition is caused by several common bacteria. You may get the infection if you come into close contact with another person who is infected. You may also come into contact with items that are contaminated with the bacteria, such as a face towel, contact lens solution, or eye makeup.  What increases the risk?  This condition is more likely to develop in people who:  · Are exposed to other people who have the  infection.  · Wear contact lenses.  · Have a sinus infection.  · Have had a recent eye injury or surgery.  · Have a weak body defense system (immune system).  · Have a medical condition that causes dry eyes.    What are the signs or symptoms?  Symptoms of this condition include:  · Eye redness.  · Tearing or watery eyes.  · Itchy eyes.  · Burning feeling in your eyes.  · Thick, yellowish discharge from an eye. This may turn into a crust on the eyelid overnight and cause your eyelids to stick together.  · Swollen eyelids.  · Blurred vision.    How is this diagnosed?  Your health care provider can diagnose this condition based on your symptoms and medical history. Your health care provider may also take a sample of discharge from your eye to find the cause of your infection. This is rarely done.  How is this treated?  Treatment for this condition includes:  · Antibiotic eye drops or ointment to clear the infection more quickly and prevent the spread of infection to others.  · Oral antibiotic medicines to treat infections that do not respond to drops or ointments, or last longer than 10 days.  · Cool, wet cloths (cool compresses) placed on the eyes.  · Artificial tears applied 2-6 times a day.    Follow these instructions at home:  Medicines  · Take or apply your antibiotic medicine as told by your health care provider. Do not stop taking or applying the antibiotic even if you start to feel better.  · Take or apply over-the-counter and prescription medicines only as told by your health care provider.  · Be very careful to avoid touching the edge of your eyelid with the eye drop bottle or the ointment tube when you apply medicines to the affected eye. This will keep you from spreading the infection to your other eye or to other people.  Managing discomfort  · Gently wipe away any drainage from your eye with a warm, wet washcloth or a cotton ball.  · Apply a cool, clean washcloth to your eye for 10-20 minutes, 3-4 times  a day.  General instructions  · Do not wear contact lenses until the inflammation is gone and your health care provider says it is safe to wear them again. Ask your health care provider how to sterilize or replace your contact lenses before you use them again. Wear glasses until you can resume wearing contacts.  · Avoid wearing eye makeup until the inflammation is gone. Throw away any old eye cosmetics that may be contaminated.  · Change or wash your pillowcase every day.  · Do not share towels or washcloths. This may spread the infection.  · Wash your hands often with soap and water. Use paper towels to dry your hands.  · Avoid touching or rubbing your eyes.  · Do not drive or use heavy machinery if your vision is blurred.  Contact a health care provider if:  · You have a fever.  · Your symptoms do not get better after 10 days.  Get help right away if:  · You have a fever and your symptoms suddenly get worse.  · You have severe pain when you move your eye.  · You have facial pain, redness, or swelling.  · You have sudden loss of vision.  This information is not intended to replace advice given to you by your health care provider. Make sure you discuss any questions you have with your health care provider.  Document Released: 12/18/2006 Document Revised: 04/27/2017 Document Reviewed: 09/29/2016  [employer] Interactive Patient Education © 2017 [employer]        DEE Cooley

## 2019-02-11 NOTE — PATIENT INSTRUCTIONS
Bacterial Conjunctivitis  Bacterial conjunctivitis is an infection of the clear membrane that covers the white part of your eye and the inner surface of your eyelid (conjunctiva). When the blood vessels in your conjunctiva become inflamed, your eye becomes red or pink, and it will probably feel itchy. Bacterial conjunctivitis spreads very easily from person to person (is contagious). It also spreads easily from one eye to the other eye.  What are the causes?  This condition is caused by several common bacteria. You may get the infection if you come into close contact with another person who is infected. You may also come into contact with items that are contaminated with the bacteria, such as a face towel, contact lens solution, or eye makeup.  What increases the risk?  This condition is more likely to develop in people who:  · Are exposed to other people who have the infection.  · Wear contact lenses.  · Have a sinus infection.  · Have had a recent eye injury or surgery.  · Have a weak body defense system (immune system).  · Have a medical condition that causes dry eyes.    What are the signs or symptoms?  Symptoms of this condition include:  · Eye redness.  · Tearing or watery eyes.  · Itchy eyes.  · Burning feeling in your eyes.  · Thick, yellowish discharge from an eye. This may turn into a crust on the eyelid overnight and cause your eyelids to stick together.  · Swollen eyelids.  · Blurred vision.    How is this diagnosed?  Your health care provider can diagnose this condition based on your symptoms and medical history. Your health care provider may also take a sample of discharge from your eye to find the cause of your infection. This is rarely done.  How is this treated?  Treatment for this condition includes:  · Antibiotic eye drops or ointment to clear the infection more quickly and prevent the spread of infection to others.  · Oral antibiotic medicines to treat infections that do not respond to drops or  ointments, or last longer than 10 days.  · Cool, wet cloths (cool compresses) placed on the eyes.  · Artificial tears applied 2-6 times a day.    Follow these instructions at home:  Medicines  · Take or apply your antibiotic medicine as told by your health care provider. Do not stop taking or applying the antibiotic even if you start to feel better.  · Take or apply over-the-counter and prescription medicines only as told by your health care provider.  · Be very careful to avoid touching the edge of your eyelid with the eye drop bottle or the ointment tube when you apply medicines to the affected eye. This will keep you from spreading the infection to your other eye or to other people.  Managing discomfort  · Gently wipe away any drainage from your eye with a warm, wet washcloth or a cotton ball.  · Apply a cool, clean washcloth to your eye for 10-20 minutes, 3-4 times a day.  General instructions  · Do not wear contact lenses until the inflammation is gone and your health care provider says it is safe to wear them again. Ask your health care provider how to sterilize or replace your contact lenses before you use them again. Wear glasses until you can resume wearing contacts.  · Avoid wearing eye makeup until the inflammation is gone. Throw away any old eye cosmetics that may be contaminated.  · Change or wash your pillowcase every day.  · Do not share towels or washcloths. This may spread the infection.  · Wash your hands often with soap and water. Use paper towels to dry your hands.  · Avoid touching or rubbing your eyes.  · Do not drive or use heavy machinery if your vision is blurred.  Contact a health care provider if:  · You have a fever.  · Your symptoms do not get better after 10 days.  Get help right away if:  · You have a fever and your symptoms suddenly get worse.  · You have severe pain when you move your eye.  · You have facial pain, redness, or swelling.  · You have sudden loss of vision.  This  information is not intended to replace advice given to you by your health care provider. Make sure you discuss any questions you have with your health care provider.  Document Released: 12/18/2006 Document Revised: 04/27/2017 Document Reviewed: 09/29/2016  [employer] Interactive Patient Education © 2017 [employer]